# Patient Record
Sex: FEMALE | URBAN - METROPOLITAN AREA
[De-identification: names, ages, dates, MRNs, and addresses within clinical notes are randomized per-mention and may not be internally consistent; named-entity substitution may affect disease eponyms.]

---

## 2022-03-17 ENCOUNTER — APPOINTMENT (OUTPATIENT)
Dept: URBAN - METROPOLITAN AREA CLINIC 285 | Age: 47
Setting detail: DERMATOLOGY
End: 2022-03-21

## 2022-03-17 DIAGNOSIS — L65.9 NONSCARRING HAIR LOSS, UNSPECIFIED: ICD-10-CM

## 2022-03-17 PROCEDURE — 99202 OFFICE O/P NEW SF 15 MIN: CPT

## 2022-03-17 PROCEDURE — OTHER MIPS QUALITY: OTHER

## 2022-03-17 PROCEDURE — OTHER ADDITIONAL NOTES: OTHER

## 2022-03-17 PROCEDURE — OTHER COUNSELING: OTHER

## 2022-03-17 ASSESSMENT — LOCATION SIMPLE DESCRIPTION DERM
LOCATION SIMPLE: LEFT SCALP
LOCATION SIMPLE: RIGHT EYEBROW
LOCATION SIMPLE: SUPERIOR FOREHEAD
LOCATION SIMPLE: SCALP
LOCATION SIMPLE: LEFT EYEBROW

## 2022-03-17 ASSESSMENT — LOCATION DETAILED DESCRIPTION DERM
LOCATION DETAILED: RIGHT SUPERIOR PARIETAL SCALP
LOCATION DETAILED: LEFT CENTRAL EYEBROW
LOCATION DETAILED: LEFT MEDIAL FRONTAL SCALP
LOCATION DETAILED: SUPERIOR MID FOREHEAD
LOCATION DETAILED: RIGHT CENTRAL EYEBROW

## 2022-03-17 ASSESSMENT — LOCATION ZONE DERM
LOCATION ZONE: SCALP
LOCATION ZONE: FACE

## 2022-03-17 NOTE — PROCEDURE: ADDITIONAL NOTES
Additional Notes: Pt states that she is noticing slight improvement in her hair growth. Pt had lab work done by pcp and everything came back normal. Pt states that she has a very stressful life as well as thyroid issues. Offered to do a bx but or declines. Recommended that Pt tries vonnie if she notices more hair loss. Advised pt that it could take 8-9 months to see improvement.
Detail Level: Simple
Render Risk Assessment In Note?: no

## 2022-03-17 NOTE — HPI: HAIR LOSS
Previous Labs: Yes
How Did The Hair Loss Occur?: sudden in onset
How Severe Is Your Hair Loss?: mild
When Were The Labs Drawn? (Drawn...): Pcp